# Patient Record
Sex: FEMALE | Race: WHITE | URBAN - METROPOLITAN AREA
[De-identification: names, ages, dates, MRNs, and addresses within clinical notes are randomized per-mention and may not be internally consistent; named-entity substitution may affect disease eponyms.]

---

## 2017-05-14 ENCOUNTER — HOSPITAL ENCOUNTER (OUTPATIENT)
Facility: HOSPITAL | Age: 52
Discharge: HOME | End: 2017-05-14
Attending: INTERNAL MEDICINE | Admitting: INTERNAL MEDICINE
Payer: COMMERCIAL

## 2017-05-14 VITALS
WEIGHT: 168.19 LBS | RESPIRATION RATE: 18 BRPM | SYSTOLIC BLOOD PRESSURE: 124 MMHG | DIASTOLIC BLOOD PRESSURE: 70 MMHG | TEMPERATURE: 98 F | BODY MASS INDEX: 26 KG/M2

## 2017-05-14 PROCEDURE — 96372 THER/PROPH/DIAG INJ SC/IM: CPT

## 2017-05-14 RX ORDER — HYDROCODONE BITARTRATE AND ACETAMINOPHEN 5; 325 MG/1; MG/1
1 TABLET ORAL EVERY 4 HOURS PRN
Status: DISCONTINUED | OUTPATIENT
Start: 2017-05-14 | End: 2017-05-14

## 2017-05-14 RX ORDER — ENOXAPARIN SODIUM 100 MG/ML
1 INJECTION SUBCUTANEOUS EVERY 12 HOURS SCHEDULED
Status: DISCONTINUED | OUTPATIENT
Start: 2017-05-14 | End: 2017-05-14

## 2017-05-14 RX ORDER — ACETAMINOPHEN 500 MG
500 TABLET ORAL EVERY 6 HOURS PRN
Status: DISCONTINUED | OUTPATIENT
Start: 2017-05-14 | End: 2017-05-14

## 2017-05-14 RX ORDER — HYDROCODONE BITARTRATE AND ACETAMINOPHEN 5; 325 MG/1; MG/1
1 TABLET ORAL EVERY 6 HOURS PRN
Status: DISCONTINUED | OUTPATIENT
Start: 2017-05-14 | End: 2017-05-14

## 2017-05-14 RX ORDER — HYDROCODONE BITARTRATE AND ACETAMINOPHEN 5; 325 MG/1; MG/1
2 TABLET ORAL EVERY 4 HOURS PRN
Status: DISCONTINUED | OUTPATIENT
Start: 2017-05-14 | End: 2017-05-14

## 2017-05-14 RX ORDER — ACETAMINOPHEN 325 MG/1
650 TABLET ORAL EVERY 4 HOURS PRN
Status: DISCONTINUED | OUTPATIENT
Start: 2017-05-14 | End: 2017-05-14

## 2017-05-14 RX ORDER — ENOXAPARIN SODIUM 100 MG/ML
30 INJECTION SUBCUTANEOUS ONCE
Status: COMPLETED | OUTPATIENT
Start: 2017-05-14 | End: 2017-05-14

## 2017-05-14 RX ORDER — ONDANSETRON 2 MG/ML
4 INJECTION INTRAMUSCULAR; INTRAVENOUS EVERY 6 HOURS PRN
Status: DISCONTINUED | OUTPATIENT
Start: 2017-05-14 | End: 2017-05-14

## 2017-05-14 NOTE — H&P
[facility] Hospitalist History and Physical      No chief complaint on file. PCP: Malik Garcia MD      History of Present Illness: Patient is a 54year old female with PMH sig for none who presents to IC with LLE pain and swelling.  Pt states she w nails.  NEUROLOGIC:  No paresthesias, weakness, or numbness. PSYCHIATRIC:  No disorder of thought or mood. ENDOCRINE:  No heat or cold intolerance, polyuria or polydipsia. HEMATOLOGICAL:  No easy bruising or bleeding.      OBJECTIVE:  /70 mmHg  Tem thrombosis of the distal left popliteal vein. The thrombus also extends into the proximal/distal left posterior tibial veins, which are noncompressible and contain intraluminal echogenic thrombus.  The left peroneal veins are fully compressible and demonstr [phone] [id number]     Phone:  [phone]    - rivaroxaban 15 MG Tabs  - Rivaroxaban 20 MG Tabs        No Follow-up on file.  F/u with PCP

## 2017-05-14 NOTE — PLAN OF CARE
Spoke to [id number] is covered 15 mg dose first 3 weeks $30, each month thereafter for 20 mg tabs $30. Patient updated.

## 2017-05-14 NOTE — PLAN OF CARE
NURSING DISCHARGE NOTE    Discharged Home via Ambulatory. Accompanied by Spouse  Belongings Taken by patient/family. IV removed, IV catheter intact. Pt given d/c instructions. Info on DVTs and xarelto given. Pt verbalized understanding.

## 2017-05-14 NOTE — CM/SW NOTE
sw spoke to RN who states she called in the prescription for xarelto and there is no prior auth required so nothing additional is needed.

## 2017-05-14 NOTE — PROGRESS NOTES
5/14/17     To whom it make concern,    Rosa Jones was seen at [facility] on 5/14/17 for a medical condition. She will be able to return to work on 5/18/17.     Yoselin Michael DO  [facility] Hospitalist  [phone]

## 2017-05-14 NOTE — PLAN OF CARE
Direct admit into room 2604. Admitted for DVT to left lower extremity. LLE warm/dry, slight pink tone noted to top of left foot. Pt a/o x 4, RA, NSR in 60s. Lungs WNL. Pt to be d/c on xarelto, approval through pharmacy.    Pt and spouse updated on

## 2017-05-15 NOTE — PAYOR COMM NOTE
Attending Physician: No att. providers found    Review Type: ADMISSION   Reviewer: Gwen Massey       Date: May 15, 2017 - 11:30 AM  Payor: [facility]   Authorization Number: [id number]  Admit date: 5/14/2017  2:53 PM   Admitted from Emergency Dept.: no  REV

## 2017-05-16 PROBLEM — I82.432 ACUTE DEEP VEIN THROMBOSIS (DVT) OF POPLITEAL VEIN OF LEFT LOWER EXTREMITY (HCC): Status: ACTIVE | Noted: 2017-05-16

## 2017-05-16 PROBLEM — M54.32 SCIATICA OF LEFT SIDE: Status: ACTIVE | Noted: 2017-05-16

## 2017-05-24 PROBLEM — R93.5 ABNORMAL CT SCAN, PELVIS: Status: ACTIVE | Noted: 2017-05-24

## 2017-05-24 PROBLEM — R93.2 ABNORMAL CT OF LIVER: Status: ACTIVE | Noted: 2017-05-24

## 2018-03-13 PROBLEM — I82.442 ACUTE DEEP VEIN THROMBOSIS (DVT) OF TIBIAL VEIN OF LEFT LOWER EXTREMITY (HCC): Status: ACTIVE | Noted: 2018-03-13

## 2018-03-14 PROCEDURE — 81240 F2 GENE: CPT | Performed by: INTERNAL MEDICINE

## 2018-03-14 PROCEDURE — 81241 F5 GENE: CPT | Performed by: INTERNAL MEDICINE

## 2018-05-09 PROBLEM — Z79.01 ON ANTICOAGULANT THERAPY: Status: ACTIVE | Noted: 2018-05-09

## 2018-05-09 PROBLEM — Z12.11 COLON CANCER SCREENING: Status: ACTIVE | Noted: 2018-05-09

## 2018-05-09 PROBLEM — Z80.0 FAMILY HISTORY OF COLON CANCER: Status: ACTIVE | Noted: 2018-05-09

## 2018-05-09 PROCEDURE — 87624 HPV HI-RISK TYP POOLED RSLT: CPT | Performed by: OBSTETRICS & GYNECOLOGY

## 2018-05-09 PROCEDURE — 88175 CYTOPATH C/V AUTO FLUID REDO: CPT | Performed by: OBSTETRICS & GYNECOLOGY

## 2018-10-08 PROCEDURE — 87081 CULTURE SCREEN ONLY: CPT | Performed by: EMERGENCY MEDICINE

## 2019-05-20 PROBLEM — M54.32 SCIATICA OF LEFT SIDE: Status: RESOLVED | Noted: 2017-05-16 | Resolved: 2019-05-20

## 2019-05-20 PROBLEM — Z86.718 HISTORY OF RECURRENT DEEP VEIN THROMBOSIS (DVT): Status: ACTIVE | Noted: 2019-05-20

## 2019-05-20 PROBLEM — I82.432 ACUTE DEEP VEIN THROMBOSIS (DVT) OF POPLITEAL VEIN OF LEFT LOWER EXTREMITY (HCC): Status: RESOLVED | Noted: 2017-05-16 | Resolved: 2019-05-20

## 2019-05-20 PROBLEM — I82.442 ACUTE DEEP VEIN THROMBOSIS (DVT) OF TIBIAL VEIN OF LEFT LOWER EXTREMITY (HCC): Status: RESOLVED | Noted: 2018-03-13 | Resolved: 2019-05-20

## 2020-03-23 PROBLEM — R93.2 ABNORMAL CT OF LIVER: Status: RESOLVED | Noted: 2017-05-24 | Resolved: 2020-03-23

## 2021-09-28 PROBLEM — R93.5 ABNORMAL CT SCAN, PELVIS: Status: RESOLVED | Noted: 2017-05-24 | Resolved: 2021-09-28

## 2021-09-28 PROBLEM — Z12.11 COLON CANCER SCREENING: Status: RESOLVED | Noted: 2018-05-09 | Resolved: 2021-09-28

## 2021-09-28 PROBLEM — F41.9 ANXIETY: Status: ACTIVE | Noted: 2021-09-28

## 2021-11-24 PROBLEM — F41.1 GAD (GENERALIZED ANXIETY DISORDER): Status: ACTIVE | Noted: 2021-11-24

## 2021-11-24 PROBLEM — F90.0 ATTENTION DEFICIT HYPERACTIVITY DISORDER, INATTENTIVE TYPE: Status: ACTIVE | Noted: 2021-11-24

## (undated) NOTE — IP AVS SNAPSHOT
[facility] [place]  [address] [place], [address] ~ [phone]                Discharge Summary   5/14/2017    Jer Amador           Admission Information        Provider Department    5/14/2017 GASPER CHOWDHURY DO Eh 2ne-A DEEP VEIN THROMBOSIS, UNDERSTANDING  (ENGLISH)    RIVAROXABAN ORAL TABLETS (ENGLISH)      Follow-up Information     Schedule an appointment as soon as possible for a visit with Poppy Lloyd MD.    Specialty:  Internal Medicine    Contact inform you to explore options for quitting.     - If you have concerns related to behavioral health issues or thoughts of harming yourself, contact [facility] at [phone].     - If you don’t have insurance, Saritha Reaves experience these side effects or respond to medications the same. Please call your provider or healthcare team if you have any questions regarding your medications while at home.          Blood Thinners (Anticoagulants)     rivaroxaban 15 MG Oral Tab    Ernie